# Patient Record
Sex: FEMALE | URBAN - METROPOLITAN AREA
[De-identification: names, ages, dates, MRNs, and addresses within clinical notes are randomized per-mention and may not be internally consistent; named-entity substitution may affect disease eponyms.]

---

## 2022-03-31 ENCOUNTER — LAB OUTSIDE AN ENCOUNTER (OUTPATIENT)
Dept: URBAN - METROPOLITAN AREA CLINIC 74 | Facility: CLINIC | Age: 47
End: 2022-03-31

## 2022-03-31 ENCOUNTER — OFFICE VISIT (OUTPATIENT)
Dept: URBAN - METROPOLITAN AREA CLINIC 74 | Facility: CLINIC | Age: 47
End: 2022-03-31
Payer: SELF-PAY

## 2022-03-31 ENCOUNTER — WEB ENCOUNTER (OUTPATIENT)
Dept: URBAN - METROPOLITAN AREA CLINIC 74 | Facility: CLINIC | Age: 47
End: 2022-03-31

## 2022-03-31 VITALS
OXYGEN SATURATION: 99 % | SYSTOLIC BLOOD PRESSURE: 90 MMHG | HEART RATE: 76 BPM | TEMPERATURE: 97.6 F | DIASTOLIC BLOOD PRESSURE: 70 MMHG | WEIGHT: 151.4 LBS

## 2022-03-31 DIAGNOSIS — K59.04 CHRONIC IDIOPATHIC CONSTIPATION: ICD-10-CM

## 2022-03-31 DIAGNOSIS — R14.2 BURPING: ICD-10-CM

## 2022-03-31 DIAGNOSIS — K62.5 RECTAL BLEEDING: ICD-10-CM

## 2022-03-31 PROCEDURE — 99203 OFFICE O/P NEW LOW 30 MIN: CPT | Performed by: INTERNAL MEDICINE

## 2022-03-31 PROCEDURE — 83013 H PYLORI (C-13) BREATH: CPT | Performed by: INTERNAL MEDICINE

## 2022-03-31 PROCEDURE — 83014 H PYLORI DRUG ADMIN: CPT | Performed by: INTERNAL MEDICINE

## 2022-03-31 RX ORDER — AMITRIPTYLINE HYDROCHLORIDE 10 MG/1
1 TABLET AT BEDTIME TABLET, FILM COATED ORAL ONCE A DAY
Status: ACTIVE | COMMUNITY

## 2022-03-31 RX ORDER — OMEPRAZOLE 40 MG/1
1 CAPSULE 30 MINUTES BEFORE MORNING MEAL CAPSULE, DELAYED RELEASE ORAL ONCE A DAY
Qty: 90 | Refills: 1 | OUTPATIENT
Start: 2022-03-31

## 2022-03-31 RX ORDER — PROPYLTHIOURACIL 50 MG/1
1 TABLET TABLET ORAL TWICE A DAY
Status: ACTIVE | COMMUNITY

## 2022-03-31 NOTE — HPI-TODAY'S VISIT:
45 yo  female here for abdominal pain/constipation. she has had chronic constipation for years. feels bloated/gassy. daughter serves as the . on elavil for headaches. no prior EGD/colonoscopy. has to strain to have BM. pain is better after BM. occasionally sees blood in the stool

## 2022-04-03 ENCOUNTER — DASHBOARD ENCOUNTERS (OUTPATIENT)
Age: 47
End: 2022-04-03

## 2022-04-03 PROBLEM — 82934008: Status: ACTIVE | Noted: 2022-03-31

## 2022-04-03 PROBLEM — 12063002: Status: ACTIVE | Noted: 2022-03-31

## 2022-05-06 ENCOUNTER — OFFICE VISIT (OUTPATIENT)
Dept: URBAN - METROPOLITAN AREA SURGERY CENTER 30 | Facility: SURGERY CENTER | Age: 47
End: 2022-05-06

## 2022-06-14 ENCOUNTER — OFFICE VISIT (OUTPATIENT)
Dept: URBAN - METROPOLITAN AREA SURGERY CENTER 30 | Facility: SURGERY CENTER | Age: 47
End: 2022-06-14

## 2022-07-05 ENCOUNTER — OFFICE VISIT (OUTPATIENT)
Dept: URBAN - METROPOLITAN AREA SURGERY CENTER 30 | Facility: SURGERY CENTER | Age: 47
End: 2022-07-05

## 2022-08-11 ENCOUNTER — OFFICE VISIT (OUTPATIENT)
Dept: URBAN - METROPOLITAN AREA SURGERY CENTER 30 | Facility: SURGERY CENTER | Age: 47
End: 2022-08-11
Payer: SELF-PAY

## 2022-08-11 DIAGNOSIS — K92.1 ACUTE MELENA: ICD-10-CM

## 2022-08-11 DIAGNOSIS — R10.84 ABDOMINAL CRAMPING, GENERALIZED: ICD-10-CM

## 2022-08-11 PROCEDURE — G8907 PT DOC NO EVENTS ON DISCHARG: HCPCS | Performed by: INTERNAL MEDICINE

## 2022-08-11 PROCEDURE — PINCL ALL INCLUSIVE: Performed by: INTERNAL MEDICINE

## 2022-08-11 PROCEDURE — 45378 DIAGNOSTIC COLONOSCOPY: CPT | Performed by: INTERNAL MEDICINE

## 2022-08-11 RX ORDER — PROPYLTHIOURACIL 50 MG/1
1 TABLET TABLET ORAL TWICE A DAY
Status: ACTIVE | COMMUNITY

## 2022-08-11 RX ORDER — AMITRIPTYLINE HYDROCHLORIDE 10 MG/1
1 TABLET AT BEDTIME TABLET, FILM COATED ORAL ONCE A DAY
Status: ACTIVE | COMMUNITY

## 2022-08-11 RX ORDER — OMEPRAZOLE 40 MG/1
1 CAPSULE 30 MINUTES BEFORE MORNING MEAL CAPSULE, DELAYED RELEASE ORAL ONCE A DAY
Qty: 90 | Refills: 1 | Status: ACTIVE | COMMUNITY
Start: 2022-03-31

## 2025-03-13 ENCOUNTER — TELEPHONE ENCOUNTER (OUTPATIENT)
Dept: URBAN - METROPOLITAN AREA CLINIC 92 | Facility: CLINIC | Age: 50
End: 2025-03-13

## 2025-03-14 ENCOUNTER — OFFICE VISIT (OUTPATIENT)
Dept: URBAN - METROPOLITAN AREA CLINIC 92 | Facility: CLINIC | Age: 50
End: 2025-03-14
Payer: COMMERCIAL

## 2025-03-14 VITALS
SYSTOLIC BLOOD PRESSURE: 105 MMHG | DIASTOLIC BLOOD PRESSURE: 72 MMHG | TEMPERATURE: 97.9 F | WEIGHT: 155.6 LBS | BODY MASS INDEX: 25.92 KG/M2 | HEIGHT: 65 IN | HEART RATE: 64 BPM

## 2025-03-14 DIAGNOSIS — Z86.19 HISTORY OF HELICOBACTER PYLORI INFECTION: ICD-10-CM

## 2025-03-14 DIAGNOSIS — R14.0 BLOATING: ICD-10-CM

## 2025-03-14 DIAGNOSIS — K59.04 CHRONIC IDIOPATHIC CONSTIPATION: ICD-10-CM

## 2025-03-14 DIAGNOSIS — K21.9 GASTROESOPHAGEAL REFLUX DISEASE, UNSPECIFIED WHETHER ESOPHAGITIS PRESENT: ICD-10-CM

## 2025-03-14 DIAGNOSIS — R14.2 BELCHING: ICD-10-CM

## 2025-03-14 PROBLEM — 235595009: Status: ACTIVE | Noted: 2025-03-14

## 2025-03-14 PROCEDURE — 99214 OFFICE O/P EST MOD 30 MIN: CPT

## 2025-03-14 PROCEDURE — 99204 OFFICE O/P NEW MOD 45 MIN: CPT

## 2025-03-14 RX ORDER — OMEPRAZOLE 40 MG/1
1 CAPSULE 30 MINUTES BEFORE MORNING MEAL CAPSULE, DELAYED RELEASE ORAL ONCE A DAY
Qty: 90 | Refills: 1 | Status: DISCONTINUED | COMMUNITY
Start: 2022-03-31

## 2025-03-14 RX ORDER — PROPYLTHIOURACIL 50 MG/1
1 TABLET TABLET ORAL TWICE A DAY
Status: ACTIVE | COMMUNITY

## 2025-03-14 RX ORDER — PANTOPRAZOLE SODIUM 40 MG/1
1 TABLET 1/2 TO 1 HOUR BEFORE MORNING MEAL TABLET, DELAYED RELEASE ORAL ONCE A DAY
Qty: 90 TABLET | Refills: 3 | OUTPATIENT
Start: 2025-03-14

## 2025-03-14 RX ORDER — LINACLOTIDE 145 UG/1
1 CAPSULE AT LEAST 30 MINUTES BEFORE THE FIRST MEAL OF THE DAY ON AN EMPTY STOMACH CAPSULE, GELATIN COATED ORAL ONCE A DAY
Qty: 90 | Refills: 3 | OUTPATIENT
Start: 2025-03-14 | End: 2026-03-09

## 2025-03-14 RX ORDER — AMITRIPTYLINE HYDROCHLORIDE 10 MG/1
1 TABLET AT BEDTIME TABLET, FILM COATED ORAL ONCE A DAY
Status: ACTIVE | COMMUNITY

## 2025-03-14 NOTE — HPI-TODAY'S VISIT:
Patient is a 49 year old female who presents for bloating and constipation.   Patient reports of increased bloating and belching, worse than when she saw Dr. Matamoros in 2022. Admits to occasional reflux, nausea, and vomiting. Denies dysphagia or odynophgia. Not on a PPI. She was treated for H. pylori in 2023 but eradication was never confirmed. Never had an EGD. Denies frequent NSAID use.  Patient also experiences constipation, she may have a BM QOD. She has to drink green juice and eat oatmeal. Denies abdominal pain, hematochezia, melena, or constitutional symptoms. Normal pap smear in 2024. Paternal uncle with hx of pancreatic cancer. Paternal cousin with hx of esophageal cancer.  Patient denies any cardiac, lung, or kidney issues. No pacemaker/defibrillator, No blood thinner, No home O2. No dialysis.  Last Colonoscopy 8/11/22 with Dr. Rafy Matamoros revealed perianal skin tags, non-bleeding IH, no specimens collected, 10 yr repeat.

## 2025-05-19 ENCOUNTER — OFFICE VISIT (OUTPATIENT)
Dept: URBAN - METROPOLITAN AREA TELEHEALTH 2 | Facility: TELEHEALTH | Age: 50
End: 2025-05-19

## 2025-05-19 RX ORDER — PANTOPRAZOLE SODIUM 40 MG/1
1 TABLET 1/2 TO 1 HOUR BEFORE MORNING MEAL TABLET, DELAYED RELEASE ORAL ONCE A DAY
Qty: 90 TABLET | Refills: 3 | Status: ACTIVE | COMMUNITY
Start: 2025-03-14

## 2025-05-19 RX ORDER — AMITRIPTYLINE HYDROCHLORIDE 10 MG/1
1 TABLET AT BEDTIME TABLET, FILM COATED ORAL ONCE A DAY
Status: ACTIVE | COMMUNITY

## 2025-05-19 RX ORDER — PROPYLTHIOURACIL 50 MG/1
1 TABLET TABLET ORAL TWICE A DAY
Status: ACTIVE | COMMUNITY

## 2025-05-19 RX ORDER — LINACLOTIDE 145 UG/1
1 CAPSULE AT LEAST 30 MINUTES BEFORE THE FIRST MEAL OF THE DAY ON AN EMPTY STOMACH CAPSULE, GELATIN COATED ORAL ONCE A DAY
Qty: 90 | Refills: 3 | Status: ACTIVE | COMMUNITY
Start: 2025-03-14 | End: 2026-03-09

## 2025-05-19 NOTE — HPI-TODAY'S VISIT:
Patient is a 49 year old female who presents for bloating and constipation.   3/14/25, Patient reports of increased bloating and belching, worse than when she saw Dr. Matamoros in 2022. Admits to occasional reflux, nausea, and vomiting. Denies dysphagia or odynophgia. Not on a PPI. She was treated for H. pylori in 2023 but eradication was never confirmed. Never had an EGD. Denies frequent NSAID use.  Patient also experiences constipation, she may have a BM QOD. She has to drink green juice and eat oatmeal. Denies abdominal pain, hematochezia, melena, or constitutional symptoms. Normal pap smear in 2024. Paternal uncle with hx of pancreatic cancer. Paternal cousin with hx of esophageal cancer.  Patient denies any cardiac, lung, or kidney issues. No pacemaker/defibrillator, No blood thinner, No home O2. No dialysis.  Last Colonoscopy 8/11/22 with Dr. Rafy Matamoros revealed perianal skin tags, non-bleeding IH, no specimens collected, 10 yr repeat.  Today, via telehealth,

## 2025-06-02 ENCOUNTER — OFFICE VISIT (OUTPATIENT)
Dept: URBAN - METROPOLITAN AREA MEDICAL CENTER 29 | Facility: MEDICAL CENTER | Age: 50
End: 2025-06-02
Payer: COMMERCIAL

## 2025-06-02 DIAGNOSIS — K31.A12 GASTRIC INTESTINAL METAPLASIA WITHOUT DYSPLASIA, INVOLVING THE BODY (CORPUS): ICD-10-CM

## 2025-06-02 DIAGNOSIS — R10.13 ABDOMINAL DISCOMFORT, EPIGASTRIC: ICD-10-CM

## 2025-06-02 DIAGNOSIS — K31.A11 GASTRIC INTESTINAL METAPLASIA WITHOUT DYSPLASIA, INVOLVING THE ANTRUM: ICD-10-CM

## 2025-06-02 DIAGNOSIS — K31.89 OTHER DISEASES OF STOMACH AND DUODENUM: ICD-10-CM

## 2025-06-02 PROCEDURE — 43239 EGD BIOPSY SINGLE/MULTIPLE: CPT | Performed by: INTERNAL MEDICINE

## 2025-06-02 RX ORDER — AMITRIPTYLINE HYDROCHLORIDE 10 MG/1
1 TABLET AT BEDTIME TABLET, FILM COATED ORAL ONCE A DAY
Status: ACTIVE | COMMUNITY

## 2025-06-02 RX ORDER — PANTOPRAZOLE SODIUM 40 MG/1
1 TABLET 1/2 TO 1 HOUR BEFORE MORNING MEAL TABLET, DELAYED RELEASE ORAL ONCE A DAY
Qty: 90 TABLET | Refills: 3 | Status: ACTIVE | COMMUNITY
Start: 2025-03-14

## 2025-06-02 RX ORDER — PROPYLTHIOURACIL 50 MG/1
1 TABLET TABLET ORAL TWICE A DAY
Status: ACTIVE | COMMUNITY

## 2025-06-02 RX ORDER — LINACLOTIDE 145 UG/1
1 CAPSULE AT LEAST 30 MINUTES BEFORE THE FIRST MEAL OF THE DAY ON AN EMPTY STOMACH CAPSULE, GELATIN COATED ORAL ONCE A DAY
Qty: 90 | Refills: 3 | Status: ACTIVE | COMMUNITY
Start: 2025-03-14 | End: 2026-03-09